# Patient Record
(demographics unavailable — no encounter records)

---

## 2025-02-26 NOTE — HISTORY OF PRESENT ILLNESS
[FreeTextEntry1] : 16-year-old female who presents with her mother Makenna for abnormal menses, sometimes early sometimes late starting in July. Denies history of sexual activity. She notes days one and two period will heavy, sometimes feels dizzy but has not had to miss school due to symptoms. On average utilizes 3-4 pads a day, not saturating these. Denies dysmenorrhea.

## 2025-02-26 NOTE — PLAN
[FreeTextEntry1] : Abnormal menses, discussed with patient and her mother these symptoms are likely related to an immature HPO axis but will draw labs today to rule out any underlying causes which may lead to abnormal menses. Given menorrhagia recommend abdominal US to R/o any structural causes.  Discussed different ways to mitigate symptoms if labs and US are within normal limits such as oral contraception.  Interview was also conducted with Nena without her mother present. Nena denies any previous sexual activities. Mother had noted she has a boyfriend. Safe sex practices if/when she was to become sexually active including the importance of condom use reviewed. Also discussed options of plan B if unprotected intercourse were to occur and concern for unwanted pregnancy.  Follow up pending above results.